# Patient Record
Sex: MALE | Race: WHITE | Employment: FULL TIME | URBAN - METROPOLITAN AREA
[De-identification: names, ages, dates, MRNs, and addresses within clinical notes are randomized per-mention and may not be internally consistent; named-entity substitution may affect disease eponyms.]

---

## 2022-11-03 ENCOUNTER — OFFICE VISIT (OUTPATIENT)
Dept: SPORTS MEDICINE | Age: 47
End: 2022-11-03
Payer: COMMERCIAL

## 2022-11-03 VITALS
WEIGHT: 250 LBS | DIASTOLIC BLOOD PRESSURE: 82 MMHG | SYSTOLIC BLOOD PRESSURE: 118 MMHG | BODY MASS INDEX: 35.79 KG/M2 | TEMPERATURE: 96.8 F | RESPIRATION RATE: 16 BRPM | HEIGHT: 70 IN

## 2022-11-03 DIAGNOSIS — M17.32 POST-TRAUMATIC OSTEOARTHRITIS OF LEFT KNEE: Primary | ICD-10-CM

## 2022-11-03 PROCEDURE — 99999 PR OFFICE/OUTPT VISIT,PROCEDURE ONLY: CPT | Performed by: FAMILY MEDICINE

## 2022-11-03 PROCEDURE — 20610 DRAIN/INJ JOINT/BURSA W/O US: CPT | Performed by: FAMILY MEDICINE

## 2022-11-03 RX ORDER — LIDOCAINE HYDROCHLORIDE 10 MG/ML
3 INJECTION, SOLUTION INFILTRATION; PERINEURAL ONCE
Status: COMPLETED | OUTPATIENT
Start: 2022-11-03 | End: 2022-11-03

## 2022-11-03 RX ORDER — BETAMETHASONE SODIUM PHOSPHATE AND BETAMETHASONE ACETATE 3; 3 MG/ML; MG/ML
12 INJECTION, SUSPENSION INTRA-ARTICULAR; INTRALESIONAL; INTRAMUSCULAR; SOFT TISSUE ONCE
Status: COMPLETED | OUTPATIENT
Start: 2022-11-03 | End: 2022-11-03

## 2022-11-03 RX ORDER — BUPIVACAINE HYDROCHLORIDE 5 MG/ML
3 INJECTION, SOLUTION EPIDURAL; INTRACAUDAL ONCE
Status: COMPLETED | OUTPATIENT
Start: 2022-11-03 | End: 2022-11-03

## 2022-11-03 RX ADMIN — BUPIVACAINE HYDROCHLORIDE 15 MG: 5 INJECTION, SOLUTION EPIDURAL; INTRACAUDAL at 13:22

## 2022-11-03 RX ADMIN — BETAMETHASONE SODIUM PHOSPHATE AND BETAMETHASONE ACETATE 12 MG: 3; 3 INJECTION, SUSPENSION INTRA-ARTICULAR; INTRALESIONAL; INTRAMUSCULAR; SOFT TISSUE at 13:21

## 2022-11-03 RX ADMIN — LIDOCAINE HYDROCHLORIDE 3 ML: 10 INJECTION, SOLUTION INFILTRATION; PERINEURAL at 13:22

## 2022-11-03 ASSESSMENT — ENCOUNTER SYMPTOMS
EYE DISCHARGE: 0
FACIAL SWELLING: 0
APNEA: 0
ABDOMINAL DISTENTION: 0
CHEST TIGHTNESS: 0
SINUS PAIN: 0

## 2022-11-03 NOTE — PROGRESS NOTES
[facility] ([facility]) Physicians  Neurosurgery and Pain [facility]  [address], [address] [facility] , [address] [facility], [place]: [phone]  F: [phone]      Abdulkadir Masters  (1975)    11/3/2022    Subjective:     Abdulkadir Masters is 49 y.o. male who complains today of:    Chief Complaint   Patient presents with    Knee Pain     Left        HPI     This patient comes in after 2-year hiatus stating that his knee is bothering him and is wondering about options for treatment says he has been doing pretty good prior to that and has had a lot of things going on and has not had a chance to return as he has not had any new injuries to the knee  Allergies:  Quinolones and Benzoin    Past Medical History:   Diagnosis Date    Headache     Hypertension     Osteoarthritis     Stroke (cerebrum) [facility])      Past Surgical History:   Procedure Laterality Date    BRAIN SURGERY       History reviewed. No pertinent family history. Social History     Socioeconomic History    Marital status:      Spouse name: Not on file    Number of children: Not on file    Years of education: Not on file    Highest education level: Not on file   Occupational History    Not on file   Tobacco Use    Smoking status: Never    Smokeless tobacco: Never   Substance and Sexual Activity    Alcohol use:  Yes     Alcohol/week: 5.0 standard drinks     Types: 5 Glasses of wine per week    Drug use: Never    Sexual activity: Not on file   Other Topics Concern    Not on file   Social History Narrative    Not on file     Social Determinants of Health     Financial Resource Strain: Not on file   Food Insecurity: Not on file   Transportation Needs: Not on file   Physical Activity: Not on file   Stress: Not on file   Social Connections: Not on file   Intimate Partner Violence: Not on file   Housing Stability: Not on file       Current Outpatient Medications on File Prior to Visit   Medication Sig Dispense Refill    lisinopril (PRINIVIL;ZESTRIL) 20 MG tablet Take 20 mg by mouth daily      ASPIRIN 81 PO Take 325 mg by mouth daily      cyclobenzaprine (FLEXERIL) 5 MG tablet Take 5 mg by mouth 2 times daily as needed (Patient not taking: No sig reported)      carBAMazepine (TEGRETOL) 200 MG tablet Take 200 mg by mouth 2 times daily (Patient not taking: Reported on 11/3/2022)       No current facility-administered medications on file prior to visit. Review of Systems   Constitutional:  Negative for activity change and fever. HENT:  Negative for congestion, facial swelling and sinus pain. Eyes:  Negative for discharge. Respiratory:  Negative for apnea and chest tightness. Gastrointestinal:  Negative for abdominal distention. Endocrine: Negative for cold intolerance. Genitourinary:  Negative for difficulty urinating and dysuria. Allergic/Immunologic: Negative for immunocompromised state. Neurological:  Negative for dizziness. Hematological:  Negative for adenopathy. Psychiatric/Behavioral:  Negative for agitation, behavioral problems and confusion. Objective:     Vitals:  /82   Temp 96.8 °F (36 °C)   Resp 16   Ht 5' 10\" (1.778 m)   Wt 250 lb (113.4 kg)   BMI 35.87 kg/m² Pain Score:   5      Physical Exam  Constitutional:       Appearance: Normal appearance. Skin:     General: Skin is warm and dry. Neurological:      Mental Status: He is alert. Ortho Exam   Examination of the left knee reveals the neurovascular muscle status to be intact there is a mild edema no effusion range of motion 0-90 without pain mild Varo valgus laxity but no instability was appreciated negative Lachman's positive grind medially    I reviewed x-rays of the left knee done today    Assessment:      Diagnosis Orders   1.  Post-traumatic osteoarthritis of left knee  XR KNEE LEFT (3 VIEWS)    TN ARTHROCENTESIS ASPIR&/INJ MAJOR JT/BURSA W/O US    betamethasone acetate-betamethasone sodium phosphate (CELESTONE) injection 12 mg    bupivacaine (PF) (MARCAINE) 0.5 % injection 15 mg    lidocaine 1 % injection 3 mL          Plan:   I talked to the patient about his options and at this point we opted for a corticosteroid injection because he wanted some pain relief but he states that these do not last long so once we did the injection we are also going to request a Durolane injection for the left knee since patient is already gone through this process before and found that gel is lasting longer than anything else he is also been continue to do his exercises and is already on a home exercise program for his knee    @[facility]@    Spine Surgery  Advanced Pain Management           Provider: Barry Yanes DO          Patient Name: Raz Aguayo : 1975         Date: 11/3/22          PROCEDURE:  Knee Injection  Dx DJD left knee    After informed consent patient was put in a seated position,patient was put in a seated position the left Knee was exposed and draped. The knee joint was identified and prepped with Betadine. A 22g needle was used to inject 2cc celestone, 3cc lidocaine, and 3cc marcaine with relief of symptoms. Pt tolerated procedure well, left stable, told to ice the knee today and resume normal activity in 2 days         Orders Placed This Encounter   Medications    betamethasone acetate-betamethasone sodium phosphate (CELESTONE) injection 12 mg    bupivacaine (PF) (MARCAINE) 0.5 % injection 15 mg    lidocaine 1 % injection 3 mL       Orders Placed This Encounter   Procedures    XR KNEE LEFT (3 VIEWS)     Standing Status:   Future     Number of Occurrences:   1     Standing Expiration Date:   11/3/2023    DC ARTHROCENTESIS ASPIR&/INJ MAJOR JT/BURSA W/O US         Follow up:  Return for injection.     CAITLIN WRAY DO

## 2022-12-06 ENCOUNTER — OFFICE VISIT (OUTPATIENT)
Dept: SPORTS MEDICINE | Age: 47
End: 2022-12-06
Payer: COMMERCIAL

## 2022-12-06 VITALS
TEMPERATURE: 97.2 F | BODY MASS INDEX: 35.5 KG/M2 | DIASTOLIC BLOOD PRESSURE: 82 MMHG | SYSTOLIC BLOOD PRESSURE: 126 MMHG | HEIGHT: 70 IN | WEIGHT: 248 LBS

## 2022-12-06 DIAGNOSIS — M77.12 LATERAL EPICONDYLITIS OF BOTH ELBOWS: Primary | ICD-10-CM

## 2022-12-06 DIAGNOSIS — M77.11 LATERAL EPICONDYLITIS OF BOTH ELBOWS: Primary | ICD-10-CM

## 2022-12-06 DIAGNOSIS — M17.32 POST-TRAUMATIC OSTEOARTHRITIS OF LEFT KNEE: ICD-10-CM

## 2022-12-06 PROCEDURE — 1036F TOBACCO NON-USER: CPT | Performed by: FAMILY MEDICINE

## 2022-12-06 PROCEDURE — G8484 FLU IMMUNIZE NO ADMIN: HCPCS | Performed by: FAMILY MEDICINE

## 2022-12-06 PROCEDURE — 99213 OFFICE O/P EST LOW 20 MIN: CPT | Performed by: FAMILY MEDICINE

## 2022-12-06 PROCEDURE — G8417 CALC BMI ABV UP PARAM F/U: HCPCS | Performed by: FAMILY MEDICINE

## 2022-12-06 PROCEDURE — G8427 DOCREV CUR MEDS BY ELIG CLIN: HCPCS | Performed by: FAMILY MEDICINE

## 2022-12-06 ASSESSMENT — ENCOUNTER SYMPTOMS
EYE DISCHARGE: 0
SINUS PAIN: 0
FACIAL SWELLING: 0
CHEST TIGHTNESS: 0
ABDOMINAL DISTENTION: 0
APNEA: 0

## 2022-12-06 NOTE — PROGRESS NOTES
[facility]) Physicians  Neurosurgery and Pain [facility]  [address], [address] [facility] , [address] [facility], [place]: [phone]  F: [phone]      Abida Parks  (1975)    12/6/2022    Subjective:     Abida Parks is 49 y.o. male who complains today of:    Chief Complaint   Patient presents with    Follow-up    Knee Injury     Left knee       HPI     Patient returns stating that his knee is doing better still bothering him help 20 to 30% this is also been bilateral elbow pain in the last month and is wondering about injections in the elbows he had them before for tennis elbow and were very helpful to him  Allergies:  Quinolones and Benzoin    Past Medical History:   Diagnosis Date    Headache     Hypertension     Osteoarthritis     Stroke (cerebrum) [facility])      Past Surgical History:   Procedure Laterality Date    BRAIN SURGERY       History reviewed. No pertinent family history. Social History     Socioeconomic History    Marital status:      Spouse name: Not on file    Number of children: Not on file    Years of education: Not on file    Highest education level: Not on file   Occupational History    Not on file   Tobacco Use    Smoking status: Never    Smokeless tobacco: Never   Substance and Sexual Activity    Alcohol use:  Yes     Alcohol/week: 5.0 standard drinks     Types: 5 Glasses of wine per week    Drug use: Never    Sexual activity: Not on file   Other Topics Concern    Not on file   Social History Narrative    Not on file     Social Determinants of Health     Financial Resource Strain: Not on file   Food Insecurity: Not on file   Transportation Needs: Not on file   Physical Activity: Not on file   Stress: Not on file   Social Connections: Not on file   Intimate Partner Violence: Not on file   Housing Stability: Not on file       Current Outpatient Medications on File Prior to Visit   Medication Sig Dispense Refill    lisinopril (PRINIVIL;ZESTRIL) 20 MG tablet Take 20 mg by mouth daily      ASPIRIN 81 PO Take 325 mg by mouth daily      cyclobenzaprine (FLEXERIL) 5 MG tablet Take 5 mg by mouth 2 times daily as needed (Patient not taking: No sig reported)      carBAMazepine (TEGRETOL) 200 MG tablet Take 200 mg by mouth 2 times daily (Patient not taking: No sig reported)       No current facility-administered medications on file prior to visit. Review of Systems   Constitutional:  Negative for activity change and fever. HENT:  Negative for congestion, facial swelling and sinus pain. Eyes:  Negative for discharge. Respiratory:  Negative for apnea and chest tightness. Gastrointestinal:  Negative for abdominal distention. Endocrine: Negative for cold intolerance. Genitourinary:  Negative for difficulty urinating and dysuria. Allergic/Immunologic: Negative for immunocompromised state. Neurological:  Negative for dizziness. Hematological:  Negative for adenopathy. Psychiatric/Behavioral:  Negative for agitation, behavioral problems and confusion. Objective:     Vitals:  /82 (Site: Left Upper Arm)   Temp 97.2 °F (36.2 °C) (Temporal)   Ht 5' 10\" (1.778 m)   Wt 248 lb (112.5 kg)   BMI 35.58 kg/m² Pain Score:   3      Physical Exam  Constitutional:       Appearance: Normal appearance. Skin:     General: Skin is warm and dry. Neurological:      Mental Status: He is alert. Ortho Exam   Examination of the elbows bilaterally revealed neurovascular muscle status to be intact good range of motion tenderness mostly over the lateral epicondyle with positive BR sign positive shoulder exam ligaments and tendons are intact there is no instability is noted  Examination of the left knee reveals good range of motion no edema or effusion tenderness over the medial joint with a positive grind test medially    I reviewed x-rays of the elbows done bilaterally today    Assessment:      Diagnosis Orders   1.  Lateral epicondylitis of both elbows  XR ELBOW RIGHT (MIN 3 VIEWS)    XR ELBOW LEFT (MIN 3 VIEWS)      2. Post-traumatic osteoarthritis of left knee            Plan:   Regarding the elbows and have the patient return for ultrasound-guided injections in the both elbows  Regarding the knee return for a Durolane injection in the right knee once we get permission  In the interim he is to continue his exercises for both his elbows and his knee       No orders of the defined types were placed in this encounter. Orders Placed This Encounter   Procedures    XR ELBOW RIGHT (MIN 3 VIEWS)     Standing Status:   Future     Standing Expiration Date:   12/6/2023    XR ELBOW LEFT (MIN 3 VIEWS)     Standing Status:   Future     Standing Expiration Date:   12/6/2023         Follow up:  Return for injection.     CAITLIN WRAY DO

## 2022-12-07 ENCOUNTER — TELEPHONE (OUTPATIENT)
Dept: PAIN MANAGEMENT | Age: 47
End: 2022-12-07

## 2022-12-07 NOTE — TELEPHONE ENCOUNTER
PER THE OV NOTE, DR WRAY STATES THE PATIENT HAS HAD DUROLANE IN THE PAST WITH LASTING RELIEF. WE ARE GOING TO NEED MEDIAL RECORDS ON THE PATIENTS KNEE TO SUBMIT FOR INSURANCE. DUROLANE IS ALSO NOT A PREFERRED DRUG FOR [address]. EUFLEXXA, SYNVISC AND SYNVISC-ONE ARE PREFERRED DRUGS.     PLEASE ADVISE PATIENT IN ORDER TO SUBMIT FOR DUROLANE WE NEED HIS PREVIOUS MEDICAL RECORDS ON THE LEFT KNEE

## 2022-12-08 NOTE — TELEPHONE ENCOUNTER
Spoke with the patient he stated he seen Dr Karyle Gola over 2 years ago at [facility] , patient was stated he followed Dr Karyle Gola from practice to practice. Patient is frustrated because he stated [facility] before Dr Karyle Gola moved practice. Patient stated [facility] need to do there job and get the records. Patient did not understand why his insurance approved this and now all of a sudden does not. Patient is also upset about getting a bill from [facility]. I explained to the patient that I was just relaying the message he can contact [facility] billing regarding the bill, but the patient wants us to get the records from [facility] since they should belong to Dr Karyle Gola, patient was advised several times to contact [facility] to sign a records release. Patient wants [facility] to get those records.

## 2023-07-25 ENCOUNTER — TELEPHONE (OUTPATIENT)
Dept: PAIN MANAGEMENT | Age: 48
End: 2023-07-25

## 2023-07-25 NOTE — TELEPHONE ENCOUNTER
REFERRAL NUMBER [id number]    LEFT KNEE EUFLEXXA X3    AUTH FROM 7/24/23-1/19/24    OK to schedule procedure approved as above. Please note sides/levels approved and date range.    (If applicable, sides/levels approved may differ from those ordered)    TO BE SCHEDULED WITH DR Veva Fothergill

## 2023-08-01 ENCOUNTER — PROCEDURE VISIT (OUTPATIENT)
Dept: SPORTS MEDICINE | Age: 48
End: 2023-08-01
Payer: COMMERCIAL

## 2023-08-01 DIAGNOSIS — M17.32 POST-TRAUMATIC OSTEOARTHRITIS OF LEFT KNEE: Primary | ICD-10-CM

## 2023-08-01 PROCEDURE — 99999 PR OFFICE/OUTPT VISIT,PROCEDURE ONLY: CPT | Performed by: FAMILY MEDICINE

## 2023-08-01 PROCEDURE — 20610 DRAIN/INJ JOINT/BURSA W/O US: CPT | Performed by: FAMILY MEDICINE

## 2023-08-01 RX ORDER — HYALURONATE SODIUM 10 MG/ML
20 SYRINGE (ML) INTRAARTICULAR ONCE
Status: COMPLETED | OUTPATIENT
Start: 2023-08-01 | End: 2023-08-01

## 2023-08-01 RX ADMIN — Medication 20 MG: at 15:23

## 2023-08-01 NOTE — PROGRESS NOTES
@[facility]@    Spine Surgery  Advanced Pain Management           Provider: Liban Floyd DO          Patient Name: Ursula Mosher : 1975         Date: 23          PROCEDURE:  Knee Injection  Dx DJD left knee    After informed consent patient was put in a seated position,patient was put in a seated position the left Knee was exposed and draped. The knee joint was identified and prepped with Betadine. A 22g needle was used to inject 2 mL Euflexxa with relief of symptoms.   Pt tolerated procedure well, left stable, told to ice the knee today and resume normal activity in 2 days

## 2023-08-08 ENCOUNTER — PROCEDURE VISIT (OUTPATIENT)
Dept: SPORTS MEDICINE | Age: 48
End: 2023-08-08

## 2023-08-08 DIAGNOSIS — M17.32 POST-TRAUMATIC OSTEOARTHRITIS OF LEFT KNEE: Primary | ICD-10-CM

## 2023-08-08 RX ORDER — HYALURONATE SODIUM 10 MG/ML
20 SYRINGE (ML) INTRAARTICULAR ONCE
Status: COMPLETED | OUTPATIENT
Start: 2023-08-08 | End: 2023-08-08

## 2023-08-08 RX ADMIN — Medication 20 MG: at 15:41

## 2023-08-08 NOTE — PROGRESS NOTES
@[facility]@    Spine Surgery  Advanced Pain Management           Provider: Niko Eugene DO          Patient Name: Arlene Hale : 1975         Date: 23          PROCEDURE:  Knee Injection  dx DJD left knee    After informed consent patient was put in a seated position,patient was put in a seated position the left Knee was exposed and draped. The knee joint was identified and prepped with Betadine. A 22g needle was used to inject 2 mL Euflexxa with relief of symptoms.   Pt tolerated procedure well, left stable, told to ice the knee today and resume normal activity in 2 days

## 2023-08-15 ENCOUNTER — PROCEDURE VISIT (OUTPATIENT)
Dept: SPORTS MEDICINE | Age: 48
End: 2023-08-15
Payer: COMMERCIAL

## 2023-08-15 DIAGNOSIS — M17.32 POST-TRAUMATIC OSTEOARTHRITIS OF LEFT KNEE: Primary | ICD-10-CM

## 2023-08-15 PROCEDURE — 20610 DRAIN/INJ JOINT/BURSA W/O US: CPT | Performed by: FAMILY MEDICINE

## 2023-08-15 PROCEDURE — 99999 PR OFFICE/OUTPT VISIT,PROCEDURE ONLY: CPT | Performed by: FAMILY MEDICINE

## 2023-08-15 RX ORDER — HYALURONATE SODIUM 10 MG/ML
20 SYRINGE (ML) INTRAARTICULAR ONCE
Status: COMPLETED | OUTPATIENT
Start: 2023-08-15 | End: 2023-08-15

## 2023-08-15 RX ADMIN — Medication 20 MG: at 15:45

## 2023-08-15 NOTE — PROGRESS NOTES
@[facility]@    Spine Surgery  Advanced Pain Management           Provider: Sera Queen DO          Patient Name: Juan Jones : 1975         Date: 8/15/23          PROCEDURE:  Knee Injection  Dx DJD left knee    After informed consent patient was put in a seated position,patient was put in a seated position the left Knee was exposed and draped. The knee joint was identified and prepped with Betadine. A 22g needle was used to inject 2 mL Euflexxa with relief of symptoms.   Pt tolerated procedure well, left stable, told to ice the knee today and resume normal activity in 2 days